# Patient Record
Sex: MALE | Race: ASIAN | NOT HISPANIC OR LATINO | Employment: FULL TIME | URBAN - METROPOLITAN AREA
[De-identification: names, ages, dates, MRNs, and addresses within clinical notes are randomized per-mention and may not be internally consistent; named-entity substitution may affect disease eponyms.]

---

## 2017-06-09 ENCOUNTER — OFFICE VISIT (OUTPATIENT)
Dept: INTERNAL MEDICINE | Facility: CLINIC | Age: 32
End: 2017-06-09

## 2017-06-09 VITALS
HEART RATE: 87 BPM | HEIGHT: 67 IN | WEIGHT: 168 LBS | DIASTOLIC BLOOD PRESSURE: 82 MMHG | OXYGEN SATURATION: 99 % | BODY MASS INDEX: 26.37 KG/M2 | SYSTOLIC BLOOD PRESSURE: 140 MMHG

## 2017-06-09 DIAGNOSIS — L65.9 ALOPECIA: ICD-10-CM

## 2017-06-09 DIAGNOSIS — E78.5 HYPERLIPIDEMIA, UNSPECIFIED HYPERLIPIDEMIA TYPE: ICD-10-CM

## 2017-06-09 DIAGNOSIS — I10 ESSENTIAL HYPERTENSION: Primary | ICD-10-CM

## 2017-06-09 PROCEDURE — 99214 OFFICE O/P EST MOD 30 MIN: CPT | Performed by: HOSPITALIST

## 2017-06-09 NOTE — PROGRESS NOTES
Subjective   Ellen Schneider is a 32 y.o. male. Essential hypertension and Hyperlipidemia, unspecified hyperlipidemia type         HPI Comments: He is doing well. Sleeping fine and exercising. He has lost weight and his diet is low in salt by his report. He also reports that his facial hair is denuded in areas. He wakes up choking on rare occasions. Mainly he feel the saliva is pooling in the back of his throat. He does not use a pillow.      The following portions of the patient's history were reviewed and updated as appropriate: allergies, current medications, past family history, past medical history, past social history, past surgical history and problem list.    Review of Systems   Constitutional: Negative for activity change and appetite change.   HENT: Negative for congestion and drooling.    Eyes: Negative for discharge and itching.   Respiratory: Negative for apnea.    Cardiovascular: Negative for chest pain and leg swelling.   Gastrointestinal: Negative for abdominal distention and abdominal pain.   Endocrine: Negative for cold intolerance and heat intolerance.   Genitourinary: Negative for difficulty urinating and dysuria.   Musculoskeletal: Negative for arthralgias and back pain.       Objective   Vitals:    06/09/17 0751   BP: 140/82   Pulse: 87   SpO2: 99%       Physical Exam   Constitutional: He is oriented to person, place, and time. He appears well-developed and well-nourished.   HENT:   Head: Normocephalic and atraumatic.   Eyes: Conjunctivae and EOM are normal. Pupils are equal, round, and reactive to light.   Neck: Normal range of motion. Neck supple.   Cardiovascular: Normal rate and regular rhythm.    Pulmonary/Chest: Effort normal and breath sounds normal.   Abdominal: Soft.   Musculoskeletal: Normal range of motion.   Neurological: He is alert and oriented to person, place, and time.   Skin: Skin is warm and dry.   Psychiatric: He has a normal mood and affect. His behavior is normal. Judgment  and thought content normal.       Assessment/Plan   Ellen was seen today for essential hypertension and hyperlipidemia, unspecified hyperlipidemia type.    Diagnoses and all orders for this visit:    Essential hypertension    Hyperlipidemia, unspecified hyperlipidemia type    Alopecia    Occasional benign, micro-aspiration likely due to head positioning. Advised to elevate his head while sleeping.  He was advised to return next week for a BP check.He may need to resume his antihypertensive meds.  Results for orders placed or performed in visit on 12/09/16   POC Urinalysis Dipstick, Automated   Result Value Ref Range    Color Yellow Yellow, Straw, Dark Yellow, Beth    Clarity, UA Clear Clear    Glucose, UA Negative Negative mg/dL    Bilirubin Negative Negative    Ketones, UA Negative Negative    Specific Gravity  1.025 1.005 - 1.030    Blood, UA Negative Negative    pH, Urine 6.0 5.0 - 8.0    Protein, POC Negative Negative mg/dL    Urobilinogen, UA Normal Normal    Leukocytes Negative Negative    Nitrite, UA Negative Negative

## 2017-09-15 ENCOUNTER — OFFICE VISIT (OUTPATIENT)
Dept: INTERNAL MEDICINE | Facility: CLINIC | Age: 32
End: 2017-09-15

## 2017-09-15 VITALS
HEART RATE: 79 BPM | BODY MASS INDEX: 27.69 KG/M2 | OXYGEN SATURATION: 100 % | SYSTOLIC BLOOD PRESSURE: 140 MMHG | DIASTOLIC BLOOD PRESSURE: 100 MMHG | WEIGHT: 176.81 LBS

## 2017-09-15 DIAGNOSIS — I10 ESSENTIAL HYPERTENSION: Primary | ICD-10-CM

## 2017-09-15 DIAGNOSIS — E78.5 HYPERLIPIDEMIA, UNSPECIFIED HYPERLIPIDEMIA TYPE: ICD-10-CM

## 2017-09-15 LAB
ALBUMIN SERPL-MCNC: 4.7 G/DL (ref 3.2–4.8)
ALBUMIN/GLOB SERPL: 1.8 G/DL (ref 1.5–2.5)
ALP SERPL-CCNC: 76 U/L (ref 25–100)
ALT SERPL W P-5'-P-CCNC: 36 U/L (ref 7–40)
ANION GAP SERPL CALCULATED.3IONS-SCNC: 5 MMOL/L (ref 3–11)
ARTICHOKE IGE QN: 200 MG/DL (ref 0–130)
AST SERPL-CCNC: 30 U/L (ref 0–33)
BILIRUB SERPL-MCNC: 0.8 MG/DL (ref 0.3–1.2)
BUN BLD-MCNC: 10 MG/DL (ref 9–23)
BUN/CREAT SERPL: 11.1 (ref 7–25)
CALCIUM SPEC-SCNC: 9.8 MG/DL (ref 8.7–10.4)
CHLORIDE SERPL-SCNC: 104 MMOL/L (ref 99–109)
CHOLEST SERPL-MCNC: 276 MG/DL (ref 0–200)
CO2 SERPL-SCNC: 30 MMOL/L (ref 20–31)
CREAT BLD-MCNC: 0.9 MG/DL (ref 0.6–1.3)
GFR SERPL CREATININE-BSD FRML MDRD: 117 ML/MIN/1.73
GFR SERPL CREATININE-BSD FRML MDRD: 97 ML/MIN/1.73
GLOBULIN UR ELPH-MCNC: 2.6 GM/DL
GLUCOSE BLD-MCNC: 96 MG/DL (ref 70–100)
HDLC SERPL-MCNC: 40 MG/DL (ref 40–60)
POTASSIUM BLD-SCNC: 4.6 MMOL/L (ref 3.5–5.5)
PROT SERPL-MCNC: 7.3 G/DL (ref 5.7–8.2)
SODIUM BLD-SCNC: 139 MMOL/L (ref 132–146)
TRIGL SERPL-MCNC: 329 MG/DL (ref 0–150)

## 2017-09-15 PROCEDURE — 99214 OFFICE O/P EST MOD 30 MIN: CPT | Performed by: INTERNAL MEDICINE

## 2017-09-15 PROCEDURE — 80053 COMPREHEN METABOLIC PANEL: CPT | Performed by: INTERNAL MEDICINE

## 2017-09-15 PROCEDURE — 80061 LIPID PANEL: CPT | Performed by: INTERNAL MEDICINE

## 2017-09-15 RX ORDER — LISINOPRIL 5 MG/1
5 TABLET ORAL DAILY
Qty: 30 TABLET | Refills: 3 | Status: SHIPPED | OUTPATIENT
Start: 2017-09-15 | End: 2017-12-06

## 2017-09-15 NOTE — PROGRESS NOTES
Hyperlipidemia and Hypertension    Subjective   Ellen Schneider is a 32 y.o. male is here today for follow-up.    History of Present Illness   Mamie Pearl's , and would like to establish.h/o HTN and HLP.  He is doing well, concerned about his wife.  BP elevated today, rushing to come in here. Previously on lis/hct down to a 4th hence decided to stop  Does have a fam h/o htn and ? DM2 in mom, but thinks she's better.  Did do a 100 mile bike ride last week, but his exercise is sporadic.      Current Outpatient Prescriptions:   •  lisinopril (PRINIVIL,ZESTRIL) 5 MG tablet, Take 1 tablet by mouth Daily., Disp: 30 tablet, Rfl: 3      The following portions of the patient's history were reviewed and updated as appropriate: allergies, current medications, past family history, past medical history, past social history, past surgical history and problem list.    Review of Systems   Constitutional: Negative.  Negative for chills and fever.   HENT: Negative for ear discharge, ear pain, sinus pressure and sore throat.    Respiratory: Negative for cough, chest tightness and shortness of breath.    Cardiovascular: Negative for chest pain, palpitations and leg swelling.   Gastrointestinal: Negative for diarrhea, nausea and vomiting.   Musculoskeletal: Negative for arthralgias, back pain and myalgias.   Neurological: Negative for dizziness, syncope and headaches.   Psychiatric/Behavioral: Negative for confusion and sleep disturbance.       Objective   /100  Pulse 79  Wt 176 lb 12.9 oz (80.2 kg)  SpO2 100%  BMI 27.69 kg/m2  Physical Exam   Constitutional: He is oriented to person, place, and time. He appears well-developed and well-nourished.   HENT:   Head: Normocephalic and atraumatic.   Right Ear: External ear normal.   Left Ear: External ear normal.   Mouth/Throat: No oropharyngeal exudate.   Eyes: Conjunctivae are normal. Pupils are equal, round, and reactive to light.   Neck: Neck supple. No thyromegaly  present.   Cardiovascular: Normal rate, regular rhythm and intact distal pulses.    Pulmonary/Chest: Effort normal and breath sounds normal.   Abdominal: Soft. Bowel sounds are normal. He exhibits no distension. There is no tenderness.   Musculoskeletal: He exhibits no edema.   Neurological: He is alert and oriented to person, place, and time. No cranial nerve deficit.   Skin: Skin is warm and dry.   Psychiatric: He has a normal mood and affect. Judgment normal.   Nursing note and vitals reviewed.        Results for orders placed or performed in visit on 12/09/16   POC Urinalysis Dipstick, Automated   Result Value Ref Range    Color Yellow Yellow, Straw, Dark Yellow, Beth    Clarity, UA Clear Clear    Glucose, UA Negative Negative mg/dL    Bilirubin Negative Negative    Ketones, UA Negative Negative    Specific Gravity  1.025 1.005 - 1.030    Blood, UA Negative Negative    pH, Urine 6.0 5.0 - 8.0    Protein, POC Negative Negative mg/dL    Urobilinogen, UA Normal Normal    Leukocytes Negative Negative    Nitrite, UA Negative Negative             Assessment/Plan   Diagnoses and all orders for this visit:    Essential hypertension  -     lisinopril (PRINIVIL,ZESTRIL) 5 MG tablet; Take 1 tablet by mouth Daily.    Hyperlipidemia, unspecified hyperlipidemia type  -     Comprehensive Metabolic Panel  -     Lipid Panel    check labs, counseled extensively to improve exercise, he does note he is under stress, as there are layoffs at work.  Adv. On relaxation techniques.       Adv. To check BP at home and stop by here for a BP check weekly, for the next 3-4 weeks.If elevated, start the ace that has been ordered. Adv. Anytime, if > 160/100, to start meds.    Return in about 3 months (around 12/15/2017) for Next scheduled follow up.

## 2017-09-24 RX ORDER — PRAVASTATIN SODIUM 10 MG
10 TABLET ORAL DAILY
Qty: 30 TABLET | Refills: 3 | Status: SHIPPED | OUTPATIENT
Start: 2017-09-24

## 2017-09-27 ENCOUNTER — CLINICAL SUPPORT (OUTPATIENT)
Dept: INTERNAL MEDICINE | Facility: CLINIC | Age: 32
End: 2017-09-27

## 2017-09-27 NOTE — PROGRESS NOTES
Patient walked in for BP check per DR Bacon. PT BP is 152/70 patient stated that he will follow up next week for a recheck. Patient brought BP machine in to be check with our manual BP cuff. Patient informed that he may need to calibrate he's machine.   Patient voiced understanding

## 2017-10-04 ENCOUNTER — CLINICAL SUPPORT (OUTPATIENT)
Dept: INTERNAL MEDICINE | Facility: CLINIC | Age: 32
End: 2017-10-04

## 2017-10-04 NOTE — PROGRESS NOTES
Patient /84 Patient instructed that BP will be documented and to follow up with Dr Bacon at R/S apt. Patient voiced understanding.

## 2017-10-11 ENCOUNTER — TELEPHONE (OUTPATIENT)
Dept: INTERNAL MEDICINE | Facility: CLINIC | Age: 32
End: 2017-10-11

## 2017-10-11 ENCOUNTER — CLINICAL SUPPORT (OUTPATIENT)
Dept: INTERNAL MEDICINE | Facility: CLINIC | Age: 32
End: 2017-10-11

## 2017-10-11 NOTE — TELEPHONE ENCOUNTER
Please let Pt. Know that since his BP is staying elevated, he should start the Lisinopril he was prescribed at his visit.   Its important he start as 2 of the BP checks were > 160 systolic.

## 2017-12-06 ENCOUNTER — OFFICE VISIT (OUTPATIENT)
Dept: INTERNAL MEDICINE | Facility: CLINIC | Age: 32
End: 2017-12-06

## 2017-12-06 VITALS
RESPIRATION RATE: 16 BRPM | BODY MASS INDEX: 27.27 KG/M2 | WEIGHT: 174.13 LBS | DIASTOLIC BLOOD PRESSURE: 78 MMHG | HEART RATE: 74 BPM | SYSTOLIC BLOOD PRESSURE: 146 MMHG

## 2017-12-06 DIAGNOSIS — E78.5 HYPERLIPIDEMIA, UNSPECIFIED HYPERLIPIDEMIA TYPE: Primary | ICD-10-CM

## 2017-12-06 DIAGNOSIS — I10 ESSENTIAL HYPERTENSION: ICD-10-CM

## 2017-12-06 PROBLEM — Z82.49 FAMILY HISTORY OF EARLY CAD: Status: ACTIVE | Noted: 2017-12-06

## 2017-12-06 PROCEDURE — 99213 OFFICE O/P EST LOW 20 MIN: CPT | Performed by: INTERNAL MEDICINE

## 2017-12-06 NOTE — PROGRESS NOTES
Hypertension (fu)    Subjective   Ellen Schneider is a 32 y.o. male is here today for follow-up.    History of Present Illness   Notes he has started doing Yoga and breathing exercises regularly.  Continues to bike and play volleyball at least twice weekly.  Also drinking bitter gourd juice which is supposed to help lower cholesterol.  He does note that he stopped the lisinopril, an has not been able to take the pravastatin due bee myalgias after exercise.    Current Outpatient Prescriptions:   •  pravastatin (PRAVACHOL) 10 MG tablet, Take 1 tablet by mouth Daily., Disp: 30 tablet, Rfl: 3      The following portions of the patient's history were reviewed and updated as appropriate: allergies, current medications, past family history, past medical history, past social history, past surgical history and problem list.    Review of Systems   Constitutional: Negative.  Negative for chills and fever.   HENT: Negative for ear discharge, ear pain, sinus pressure and sore throat.    Respiratory: Negative for cough, chest tightness and shortness of breath.    Cardiovascular: Negative for chest pain, palpitations and leg swelling.   Gastrointestinal: Negative for diarrhea, nausea and vomiting.   Musculoskeletal: Negative for arthralgias, back pain and myalgias.   Neurological: Negative for dizziness, syncope and headaches.   Psychiatric/Behavioral: Negative for confusion and sleep disturbance.       Objective   /78 (BP Location: Left arm, Patient Position: Sitting)  Pulse 74  Resp 16  Wt 79 kg (174 lb 2 oz)  BMI 27.27 kg/m2  Physical Exam   Constitutional: He is oriented to person, place, and time. He appears well-developed and well-nourished.   Cardiovascular: Normal rate and regular rhythm.    Pulmonary/Chest: Effort normal and breath sounds normal.   Neurological: He is alert and oriented to person, place, and time.   Psychiatric: He has a normal mood and affect. Judgment normal.         Results for orders placed or  performed in visit on 09/15/17   Comprehensive Metabolic Panel   Result Value Ref Range    Glucose 96 70 - 100 mg/dL    BUN 10 9 - 23 mg/dL    Creatinine 0.90 0.60 - 1.30 mg/dL    Sodium 139 132 - 146 mmol/L    Potassium 4.6 3.5 - 5.5 mmol/L    Chloride 104 99 - 109 mmol/L    CO2 30.0 20.0 - 31.0 mmol/L    Calcium 9.8 8.7 - 10.4 mg/dL    Total Protein 7.3 5.7 - 8.2 g/dL    Albumin 4.70 3.20 - 4.80 g/dL    ALT (SGPT) 36 7 - 40 U/L    AST (SGOT) 30 0 - 33 U/L    Alkaline Phosphatase 76 25 - 100 U/L    Total Bilirubin 0.8 0.3 - 1.2 mg/dL    eGFR Non African Amer 97 >60 mL/min/1.73    eGFR  African Amer 117 >60 mL/min/1.73    Globulin 2.6 gm/dL    A/G Ratio 1.8 1.5 - 2.5 g/dL    BUN/Creatinine Ratio 11.1 7.0 - 25.0    Anion Gap 5.0 3.0 - 11.0 mmol/L   Lipid Panel   Result Value Ref Range    Total Cholesterol 276 (H) 0 - 200 mg/dL    Triglycerides 329 (H) 0 - 150 mg/dL    HDL Cholesterol 40 40 - 60 mg/dL    LDL Cholesterol  200 (H) 0 - 130 mg/dL             Assessment/Plan   Diagnoses and all orders for this visit:    Hyperlipidemia, unspecified hyperlipidemia type  Comments:  start pravachol once weekly add coQ 10, recheck labs at physical.    Essential hypertension    Off the lisinopril, adv. To increase fluids to 80- 100 oz daily, continue exercise and if not better, address pharmaceuticals at his physical. WIll need EKG then.           Take 3 lts water daily.  Return in about 5 months (around 5/6/2018) for Annual physical.